# Patient Record
Sex: MALE | Race: WHITE | NOT HISPANIC OR LATINO | Employment: FULL TIME | URBAN - METROPOLITAN AREA
[De-identification: names, ages, dates, MRNs, and addresses within clinical notes are randomized per-mention and may not be internally consistent; named-entity substitution may affect disease eponyms.]

---

## 2021-02-23 ENCOUNTER — LAB (OUTPATIENT)
Dept: LAB | Facility: HOSPITAL | Age: 31
End: 2021-02-23

## 2021-02-23 DIAGNOSIS — R39.89 POSSIBLE URINARY TRACT INFECTION: Primary | ICD-10-CM

## 2021-02-23 LAB
ALBUMIN SERPL-MCNC: 4.5 G/DL (ref 3.5–5.2)
ALBUMIN/GLOB SERPL: 1.8 G/DL
ALP SERPL-CCNC: 60 U/L (ref 39–117)
ALT SERPL W P-5'-P-CCNC: 17 U/L (ref 1–41)
ANION GAP SERPL CALCULATED.3IONS-SCNC: 6.3 MMOL/L (ref 5–15)
AST SERPL-CCNC: 23 U/L (ref 1–40)
BASOPHILS # BLD AUTO: 0.04 10*3/MM3 (ref 0–0.2)
BASOPHILS NFR BLD AUTO: 0.4 % (ref 0–1.5)
BILIRUB SERPL-MCNC: 0.4 MG/DL (ref 0–1.2)
BILIRUB UR QL STRIP: NEGATIVE
BUN SERPL-MCNC: 12 MG/DL (ref 6–20)
BUN/CREAT SERPL: 11.9 (ref 7–25)
CALCIUM SPEC-SCNC: 9.4 MG/DL (ref 8.6–10.5)
CHLORIDE SERPL-SCNC: 105 MMOL/L (ref 98–107)
CLARITY UR: CLEAR
CO2 SERPL-SCNC: 26.7 MMOL/L (ref 22–29)
COLOR UR: YELLOW
CREAT SERPL-MCNC: 1.01 MG/DL (ref 0.76–1.27)
DEPRECATED RDW RBC AUTO: 39.2 FL (ref 37–54)
EOSINOPHIL # BLD AUTO: 0.07 10*3/MM3 (ref 0–0.4)
EOSINOPHIL NFR BLD AUTO: 0.8 % (ref 0.3–6.2)
ERYTHROCYTE [DISTWIDTH] IN BLOOD BY AUTOMATED COUNT: 11.9 % (ref 12.3–15.4)
GFR SERPL CREATININE-BSD FRML MDRD: 87 ML/MIN/1.73
GLOBULIN UR ELPH-MCNC: 2.5 GM/DL
GLUCOSE SERPL-MCNC: 96 MG/DL (ref 65–99)
GLUCOSE UR STRIP-MCNC: NEGATIVE MG/DL
HAV IGM SERPL QL IA: NORMAL
HBV CORE IGM SERPL QL IA: NORMAL
HBV SURFACE AG SERPL QL IA: NORMAL
HCT VFR BLD AUTO: 42.1 % (ref 37.5–51)
HCV AB SER DONR QL: NORMAL
HGB BLD-MCNC: 13.8 G/DL (ref 13–17.7)
HGB UR QL STRIP.AUTO: NEGATIVE
IMM GRANULOCYTES # BLD AUTO: 0.03 10*3/MM3 (ref 0–0.05)
IMM GRANULOCYTES NFR BLD AUTO: 0.3 % (ref 0–0.5)
KETONES UR QL STRIP: NEGATIVE
LEUKOCYTE ESTERASE UR QL STRIP.AUTO: NEGATIVE
LYMPHOCYTES # BLD AUTO: 1.56 10*3/MM3 (ref 0.7–3.1)
LYMPHOCYTES NFR BLD AUTO: 17.5 % (ref 19.6–45.3)
MCH RBC QN AUTO: 29.3 PG (ref 26.6–33)
MCHC RBC AUTO-ENTMCNC: 32.8 G/DL (ref 31.5–35.7)
MCV RBC AUTO: 89.4 FL (ref 79–97)
MONOCYTES # BLD AUTO: 0.51 10*3/MM3 (ref 0.1–0.9)
MONOCYTES NFR BLD AUTO: 5.7 % (ref 5–12)
NEUTROPHILS NFR BLD AUTO: 6.69 10*3/MM3 (ref 1.7–7)
NEUTROPHILS NFR BLD AUTO: 75.3 % (ref 42.7–76)
NITRITE UR QL STRIP: NEGATIVE
NRBC BLD AUTO-RTO: 0 /100 WBC (ref 0–0.2)
PH UR STRIP.AUTO: 5.5 [PH] (ref 5–8)
PLATELET # BLD AUTO: 176 10*3/MM3 (ref 140–450)
PMV BLD AUTO: 10.6 FL (ref 6–12)
POTASSIUM SERPL-SCNC: 4.6 MMOL/L (ref 3.5–5.2)
PROT SERPL-MCNC: 7 G/DL (ref 6–8.5)
PROT UR QL STRIP: NEGATIVE
RBC # BLD AUTO: 4.71 10*6/MM3 (ref 4.14–5.8)
SODIUM SERPL-SCNC: 138 MMOL/L (ref 136–145)
SP GR UR STRIP: 1.02 (ref 1–1.03)
UROBILINOGEN UR QL STRIP: NORMAL
WBC # BLD AUTO: 8.9 10*3/MM3 (ref 3.4–10.8)

## 2021-02-23 PROCEDURE — 87491 CHLMYD TRACH DNA AMP PROBE: CPT | Performed by: FAMILY MEDICINE

## 2021-02-23 PROCEDURE — G0432 EIA HIV-1/HIV-2 SCREEN: HCPCS | Performed by: FAMILY MEDICINE

## 2021-02-23 PROCEDURE — 80053 COMPREHEN METABOLIC PANEL: CPT | Performed by: FAMILY MEDICINE

## 2021-02-23 PROCEDURE — 87529 HSV DNA AMP PROBE: CPT | Performed by: FAMILY MEDICINE

## 2021-02-23 PROCEDURE — 87591 N.GONORRHOEAE DNA AMP PROB: CPT | Performed by: FAMILY MEDICINE

## 2021-02-23 PROCEDURE — 85025 COMPLETE CBC W/AUTO DIFF WBC: CPT | Performed by: FAMILY MEDICINE

## 2021-02-23 PROCEDURE — 80074 ACUTE HEPATITIS PANEL: CPT | Performed by: FAMILY MEDICINE

## 2021-02-23 PROCEDURE — 81003 URINALYSIS AUTO W/O SCOPE: CPT | Performed by: FAMILY MEDICINE

## 2021-02-23 PROCEDURE — 36415 COLL VENOUS BLD VENIPUNCTURE: CPT | Performed by: FAMILY MEDICINE

## 2021-02-23 PROCEDURE — 86592 SYPHILIS TEST NON-TREP QUAL: CPT | Performed by: FAMILY MEDICINE

## 2021-02-24 LAB
HIV1+2 AB SER QL: NORMAL
RPR SER QL: NORMAL

## 2021-02-25 LAB
C TRACH RRNA SPEC QL NAA+PROBE: NEGATIVE
N GONORRHOEA RRNA SPEC QL NAA+PROBE: NEGATIVE

## 2021-02-27 LAB
HSV1 DNA SPEC QL NAA+PROBE: NEGATIVE
HSV2 DNA SPEC QL NAA+PROBE: NEGATIVE

## 2021-09-30 ENCOUNTER — OFFICE VISIT (OUTPATIENT)
Dept: ORTHOPEDIC SURGERY | Facility: CLINIC | Age: 31
End: 2021-09-30

## 2021-09-30 VITALS — WEIGHT: 150 LBS | HEIGHT: 68 IN | BODY MASS INDEX: 22.73 KG/M2

## 2021-09-30 DIAGNOSIS — R10.31 RIGHT GROIN PAIN: Primary | ICD-10-CM

## 2021-09-30 PROCEDURE — LCFCNOCHG: Performed by: ORTHOPAEDIC SURGERY

## 2021-10-01 ENCOUNTER — HOSPITAL ENCOUNTER (OUTPATIENT)
Dept: MRI IMAGING | Facility: HOSPITAL | Age: 31
Discharge: HOME | End: 2021-10-01

## 2021-10-01 DIAGNOSIS — R10.31 RIGHT GROIN PAIN: ICD-10-CM

## 2021-10-01 PROCEDURE — 72195 MRI PELVIS W/O DYE: CPT

## 2021-10-06 ENCOUNTER — DOCUMENTATION (OUTPATIENT)
Dept: ORTHOPEDIC SURGERY | Facility: CLINIC | Age: 31
End: 2021-10-06

## 2021-10-06 RX ORDER — METHYLPREDNISOLONE 4 MG/1
21 TABLET ORAL ONCE
Qty: 21 TABLET | Refills: 0 | Status: SHIPPED | OUTPATIENT
Start: 2021-10-06 | End: 2021-10-06

## 2021-10-18 ENCOUNTER — OFFICE VISIT (OUTPATIENT)
Dept: SPORTS MEDICINE | Facility: CLINIC | Age: 31
End: 2021-10-18

## 2021-10-18 VITALS
WEIGHT: 150 LBS | RESPIRATION RATE: 16 BRPM | HEIGHT: 68 IN | OXYGEN SATURATION: 99 % | TEMPERATURE: 98 F | BODY MASS INDEX: 22.73 KG/M2 | DIASTOLIC BLOOD PRESSURE: 76 MMHG | SYSTOLIC BLOOD PRESSURE: 116 MMHG | HEART RATE: 50 BPM

## 2021-10-18 DIAGNOSIS — S39.81XD SPORTS HERNIA, SUBSEQUENT ENCOUNTER: Primary | ICD-10-CM

## 2021-10-18 PROCEDURE — LCFCNOCHG: Performed by: FAMILY MEDICINE

## 2021-10-21 NOTE — PROGRESS NOTES
Here today for injection as previously planned for the right side sports hernia/athletic pubalgia. We reviewed and discussed his MRI findings again consistent with low-grade reinjury to his previously treated sports hernia. Discussed potential benefit of steroid injection for short-term pain relief, diagnostic benefit, also consider necessity and planning for repeat surgical treatment.    Location of the point of maximal tenderness on the right pubis was identified by palpation and verified with ultrasound. Injection was performed without concurrent ultrasound after localization. Site was marked, prepped with Betadine and alcohol for sterile technique. Injectate mixture 1 mL 1% plain lidocaine, 1 mL of 0.5% bupivacaine, 1 mL Kenalog. This was injected with a 25-gauge needle and tolerated well.    Postprocedure instructions reviewed. Relative rest today, gentle progression with rehab back to sport as tolerated after that.    Diagnoses and all orders for this visit:    Sports hernia, subsequent encounter

## 2021-10-26 ENCOUNTER — TELEPHONE (OUTPATIENT)
Dept: SPORTS MEDICINE | Facility: CLINIC | Age: 31
End: 2021-10-26

## 2021-10-26 NOTE — TELEPHONE ENCOUNTER
Jhonny was reevaluated onsite at the training facility this morning.  He does report reduction in his pain after receiving a steroid injection last week.  He continues to have pain with higher level activity and particularly with changing direction.  Pain is moderately severe, sharp.  He still has tenderness on the right pubic symphysis consistent with his sports hernia.  We discussed ongoing treatment options for this.  We will go ahead and start some daily NSAIDs and continue rehabilitation.  I also think his response at this point indicates that we will need to plan definitive surgical treatment but that can probably wait until the off season.  Goal will be to keep his pain under control and strengthen him enough to continue to play until then.

## 2021-11-11 ENCOUNTER — DOCUMENTATION (OUTPATIENT)
Dept: ORTHOPEDIC SURGERY | Facility: CLINIC | Age: 31
End: 2021-11-11

## 2021-11-11 PROBLEM — S39.81XA SPORTS HERNIA: Status: ACTIVE | Noted: 2021-11-11

## 2021-11-11 NOTE — PROGRESS NOTES
Subjective:     Patient ID: Jhonny Mcfarlane is a 31 y.o. male.    Chief Complaint:  Follow-up right hip and groin pain  History of Present Illness  Jhonny Mcfarlane presents for evaluation of right hip and groin pain, states that he has received improvement from injection to the proximal adductor tendon on his right side.  Still having sharp pain which occasionally gets up to a 7-8 out of 10 localized to the groin region particular when striking a ball for long distance.  Denies associated numbness or tingling, does note some radiation of pain down to the medial proximal thigh.     Social History     Occupational History   • Not on file   Tobacco Use   • Smoking status: Never Smoker   • Smokeless tobacco: Never Used   Vaping Use   • Vaping Use: Never used   Substance and Sexual Activity   • Alcohol use: Yes   • Drug use: Never   • Sexual activity: Defer      No past medical history on file.  No past surgical history on file.    No family history on file.      Review of Systems        Objective:  There were no vitals filed for this visit.  There were no vitals filed for this visit.  There is no height or weight on file to calculate BMI.      Ortho Exam     Right hip-maximal tenderness palpation over proximal adductor with increased pain on maximal passive abduction, 4 out of 5 strength on resisted adduction.  Negative LELA and FADIR exam.  No hip pain on logroll or Stinchfield exam.  Negative straight leg raise right lower extremity.    Imaging:  Review of outside MRI right hip and pelvis indicate strain of the right adductor longus and rectus aponeurosis, on my review there does appear to be regions of partial to high-grade tearing of the origin of both of these extending to the superior portion of the pubis on the right side adjacent to the pubic symphysis where there is no to be moderate hypertrophic changes consistent with osteitis but no active inflammation in this region noted.    Assessment:  Right hip high-grade  sports hernia       Plan:          1. Discussed treatment options at length with patient at today's visit.  Given persistent pain and limitations with functional activities with the right side as well as the fact that he has had prior surgery on that side and has recurrence of his symptoms, we discussed options at length and recommended consideration of surgical treatment for fixation of right sports hernia.  2. We will help coordinate and facilitate procedure to be done pending athletes desired location  3. Follow up: markell Mcfarlane was in agreement with plan and had all questions answered.     Orders:  No orders of the defined types were placed in this encounter.      Medications:  No orders of the defined types were placed in this encounter.      Followup:  No follow-ups on file.    There are no diagnoses linked to this encounter.      Dictated utilizing Dragon dictation

## 2021-11-22 ENCOUNTER — LAB (OUTPATIENT)
Dept: LAB | Facility: HOSPITAL | Age: 31
End: 2021-11-22

## 2021-11-22 ENCOUNTER — OFFICE VISIT (OUTPATIENT)
Dept: SPORTS MEDICINE | Facility: CLINIC | Age: 31
End: 2021-11-22

## 2021-11-22 VITALS
RESPIRATION RATE: 16 BRPM | TEMPERATURE: 98 F | BODY MASS INDEX: 22.73 KG/M2 | HEART RATE: 82 BPM | DIASTOLIC BLOOD PRESSURE: 70 MMHG | WEIGHT: 150 LBS | OXYGEN SATURATION: 99 % | HEIGHT: 68 IN | SYSTOLIC BLOOD PRESSURE: 112 MMHG

## 2021-11-22 DIAGNOSIS — S39.81XD SPORTS HERNIA, SUBSEQUENT ENCOUNTER: ICD-10-CM

## 2021-11-22 DIAGNOSIS — Z02.5 SPORTS PHYSICAL: Primary | ICD-10-CM

## 2021-11-22 DIAGNOSIS — Z01.818 PREOP EXAMINATION: ICD-10-CM

## 2021-11-22 LAB
ALBUMIN SERPL-MCNC: 4.8 G/DL (ref 3.5–5.2)
ALBUMIN/GLOB SERPL: 2.4 G/DL
ALP SERPL-CCNC: 53 U/L (ref 39–117)
ALT SERPL W P-5'-P-CCNC: 23 U/L (ref 1–41)
ANION GAP SERPL CALCULATED.3IONS-SCNC: 7.3 MMOL/L (ref 5–15)
APTT PPP: 25.9 SECONDS (ref 22.7–35.4)
AST SERPL-CCNC: 19 U/L (ref 1–40)
BASOPHILS # BLD AUTO: 0.03 10*3/MM3 (ref 0–0.2)
BASOPHILS NFR BLD AUTO: 0.5 % (ref 0–1.5)
BILIRUB SERPL-MCNC: 0.4 MG/DL (ref 0–1.2)
BUN SERPL-MCNC: 10 MG/DL (ref 6–20)
BUN/CREAT SERPL: 13.9 (ref 7–25)
CALCIUM SPEC-SCNC: 9.2 MG/DL (ref 8.6–10.5)
CHLORIDE SERPL-SCNC: 103 MMOL/L (ref 98–107)
CO2 SERPL-SCNC: 30.7 MMOL/L (ref 22–29)
CREAT SERPL-MCNC: 0.72 MG/DL (ref 0.76–1.27)
DEPRECATED RDW RBC AUTO: 39.2 FL (ref 37–54)
EOSINOPHIL # BLD AUTO: 0.17 10*3/MM3 (ref 0–0.4)
EOSINOPHIL NFR BLD AUTO: 2.8 % (ref 0.3–6.2)
ERYTHROCYTE [DISTWIDTH] IN BLOOD BY AUTOMATED COUNT: 11.9 % (ref 12.3–15.4)
GFR SERPL CREATININE-BSD FRML MDRD: 128 ML/MIN/1.73
GLOBULIN UR ELPH-MCNC: 2 GM/DL
GLUCOSE SERPL-MCNC: 104 MG/DL (ref 65–99)
HCT VFR BLD AUTO: 42.8 % (ref 37.5–51)
HGB BLD-MCNC: 14 G/DL (ref 13–17.7)
IMM GRANULOCYTES # BLD AUTO: 0.01 10*3/MM3 (ref 0–0.05)
IMM GRANULOCYTES NFR BLD AUTO: 0.2 % (ref 0–0.5)
INR PPP: 0.98 (ref 0.9–1.1)
LYMPHOCYTES # BLD AUTO: 1.85 10*3/MM3 (ref 0.7–3.1)
LYMPHOCYTES NFR BLD AUTO: 30.3 % (ref 19.6–45.3)
MCH RBC QN AUTO: 29.5 PG (ref 26.6–33)
MCHC RBC AUTO-ENTMCNC: 32.7 G/DL (ref 31.5–35.7)
MCV RBC AUTO: 90.1 FL (ref 79–97)
MONOCYTES # BLD AUTO: 0.45 10*3/MM3 (ref 0.1–0.9)
MONOCYTES NFR BLD AUTO: 7.4 % (ref 5–12)
NEUTROPHILS NFR BLD AUTO: 3.6 10*3/MM3 (ref 1.7–7)
NEUTROPHILS NFR BLD AUTO: 58.8 % (ref 42.7–76)
NRBC BLD AUTO-RTO: 0 /100 WBC (ref 0–0.2)
PLATELET # BLD AUTO: 186 10*3/MM3 (ref 140–450)
PMV BLD AUTO: 10.9 FL (ref 6–12)
POTASSIUM SERPL-SCNC: 4.5 MMOL/L (ref 3.5–5.2)
PROT SERPL-MCNC: 6.8 G/DL (ref 6–8.5)
PROTHROMBIN TIME: 12.8 SECONDS (ref 11.7–14.2)
RBC # BLD AUTO: 4.75 10*6/MM3 (ref 4.14–5.8)
SODIUM SERPL-SCNC: 141 MMOL/L (ref 136–145)
WBC NRBC COR # BLD: 6.11 10*3/MM3 (ref 3.4–10.8)

## 2021-11-22 PROCEDURE — 85610 PROTHROMBIN TIME: CPT | Performed by: FAMILY MEDICINE

## 2021-11-22 PROCEDURE — LCFCNOCHG: Performed by: FAMILY MEDICINE

## 2021-11-22 PROCEDURE — 85730 THROMBOPLASTIN TIME PARTIAL: CPT | Performed by: FAMILY MEDICINE

## 2021-11-22 PROCEDURE — 80053 COMPREHEN METABOLIC PANEL: CPT | Performed by: FAMILY MEDICINE

## 2021-11-22 PROCEDURE — 85025 COMPLETE CBC W/AUTO DIFF WBC: CPT | Performed by: FAMILY MEDICINE

## 2021-11-22 PROCEDURE — 36415 COLL VENOUS BLD VENIPUNCTURE: CPT | Performed by: FAMILY MEDICINE

## 2021-11-23 NOTE — PROGRESS NOTES
"Jhonny is a 31 y.o. year old male    Cc: Exit physical    History of Present Illness   HPI   Here today for exit physical and in the season.  He is overall doing well with exception of ongoing pain at the right groin at his site of previous sports hernia surgery with exacerbation earlier this year.  He did respond favorably to a steroid injection but notes that he still has a sense of weakness and discomfort at this site.    Review of Systems    /70 (BP Location: Right arm, Patient Position: Sitting, Cuff Size: Adult)   Pulse 82   Temp 98 °F (36.7 °C)   Resp 16   Ht 172.7 cm (68\")   Wt 68 kg (150 lb)   SpO2 99%   BMI 22.81 kg/m²          Physical Exam  Vitals reviewed.   Constitutional:       Appearance: He is well-developed.   HENT:      Head: Normocephalic and atraumatic.   Eyes:      Conjunctiva/sclera: Conjunctivae normal.      Pupils: Pupils are equal, round, and reactive to light.   Cardiovascular:      Comments: No peripheral edema  Pulmonary:      Effort: Pulmonary effort is normal.   Musculoskeletal:      Comments: Mild tenderness palpation at the right pubis into the adductor tendon.  Normal range of motion.  Normal strength with minimal discomfort.   Skin:     General: Skin is warm and dry.   Neurological:      Mental Status: He is alert and oriented to person, place, and time.   Psychiatric:         Behavior: Behavior normal.       EKG Interpretation    Indication: screening    Findings:  Normal Rate  Benign sinus arrhythmia consistent with age and conditioning  Normal QRS  Normal Axis  Normal ST Segment  Normal T Waves    No prior studies were available for comparison.      No current outpatient medications on file.     Diagnoses and all orders for this visit:    Sports physical    Sports hernia, subsequent encounter  -     CBC & Differential  -     Comprehensive Metabolic Panel  -     Protime-INR  -     APTT    Preop examination  -     CBC & Differential  -     Comprehensive Metabolic " Panel  -     Protime-INR  -     APTT       Overall he was able to successfully finish the season despite his reinjury to his groin after having a local steroid injection.  We did discuss at length that his current pain level may simply be secondary to the effects of the injection which would predictably be short-term only and would not have a curative benefit.  We discussed potential of recurrent pain given his previous surgical change and reinjury this year which shows some functional deficiency despite pain reduction.  He is going to continue rehabilitation and we will work to arrange follow-up with a specialist for this condition for definitive surgical treatment.      EMR Dragon/Transcription disclaimer:    Much of this encounter note is an electronic transcription/translation of spoken language to printed text.  The electronic translation of spoken language may permit erroneous, or at times, nonsensical words or phrases to be inadvertently transcribed.  Although I have reviewed the note for such errors some may still exist.

## 2022-03-31 ENCOUNTER — OFFICE VISIT (OUTPATIENT)
Dept: SPORTS MEDICINE | Facility: CLINIC | Age: 32
End: 2022-03-31

## 2022-03-31 VITALS — TEMPERATURE: 96.2 F

## 2022-03-31 DIAGNOSIS — S93.412A SPRAIN OF CALCANEOFIBULAR LIGAMENT OF LEFT ANKLE, INITIAL ENCOUNTER: Primary | ICD-10-CM

## 2022-03-31 PROCEDURE — LCFCNOCHG: Performed by: FAMILY MEDICINE

## 2022-03-31 NOTE — PROGRESS NOTES
Jhonny is a 32 y.o. year old male     Chief Complaint   Patient presents with   • Ankle Pain     Eval for LT ankle        History of Present Illness  HPI   Here to evaluate lateral ankle sprain that occurred 3/23/2022 during a match.  Slowly improving with standard recovery efforts but still having some pain with trying to perform rehab exercises.      Review of Systems    Temp 96.2 °F (35.7 °C) (Temporal)      Physical Exam    Vital signs reviewed.   General: No acute distress.      MSK Exam:  Ortho Exam  Left ankle: Mild soft tissue swelling.  There is tenderness to palpation overlying the ATFL, CFL, and peroneal tendons.  There is no bony tenderness.    Bedside ultrasound examination shows a normal-appearing ATFL.  There is obvious soft tissue swelling.  There is tenosynovitis of the peroneus longus and brevis at the level of the ankle.  The CFL is difficult to visualize, but with dynamic examination is taut.      Diagnoses and all orders for this visit:    Sprain of calcaneofibular ligament of left ankle, initial encounter      Reassuring exam today.  Okay to continue to progress and rehab as tolerated.

## 2022-11-09 ENCOUNTER — DOCUMENTATION (OUTPATIENT)
Dept: SPORTS MEDICINE | Facility: CLINIC | Age: 32
End: 2022-11-09

## 2022-11-09 RX ORDER — METHYLPREDNISOLONE 4 MG/1
TABLET ORAL
Qty: 21 TABLET | Refills: 0 | Status: SHIPPED | OUTPATIENT
Start: 2022-11-09

## 2023-01-23 ENCOUNTER — OFFICE VISIT (OUTPATIENT)
Dept: SPORTS MEDICINE | Facility: CLINIC | Age: 33
End: 2023-01-23

## 2023-01-23 VITALS
BODY MASS INDEX: 23.04 KG/M2 | HEIGHT: 68 IN | SYSTOLIC BLOOD PRESSURE: 122 MMHG | WEIGHT: 152 LBS | HEART RATE: 58 BPM | OXYGEN SATURATION: 99 % | DIASTOLIC BLOOD PRESSURE: 70 MMHG

## 2023-01-23 DIAGNOSIS — Z00.00 ANNUAL PHYSICAL EXAM: Primary | ICD-10-CM

## 2023-01-23 LAB
ALBUMIN SERPL-MCNC: 4.4 G/DL (ref 3.5–5.2)
ALBUMIN/GLOB SERPL: 1.6 G/DL
ALP SERPL-CCNC: 70 U/L (ref 39–117)
ALT SERPL W P-5'-P-CCNC: 16 U/L (ref 1–41)
ANION GAP SERPL CALCULATED.3IONS-SCNC: 8.6 MMOL/L (ref 5–15)
AST SERPL-CCNC: 17 U/L (ref 1–40)
BASOPHILS # BLD AUTO: 0.03 10*3/MM3 (ref 0–0.2)
BASOPHILS NFR BLD AUTO: 0.5 % (ref 0–1.5)
BILIRUB SERPL-MCNC: 0.2 MG/DL (ref 0–1.2)
BUN SERPL-MCNC: 10 MG/DL (ref 6–20)
BUN/CREAT SERPL: 10.8 (ref 7–25)
CALCIUM SPEC-SCNC: 9.2 MG/DL (ref 8.6–10.5)
CHLORIDE SERPL-SCNC: 103 MMOL/L (ref 98–107)
CHOLEST SERPL-MCNC: 169 MG/DL (ref 0–200)
CO2 SERPL-SCNC: 29.4 MMOL/L (ref 22–29)
CREAT SERPL-MCNC: 0.93 MG/DL (ref 0.76–1.27)
DEPRECATED RDW RBC AUTO: 38.2 FL (ref 37–54)
EGFRCR SERPLBLD CKD-EPI 2021: 111.9 ML/MIN/1.73
EOSINOPHIL # BLD AUTO: 0.09 10*3/MM3 (ref 0–0.4)
EOSINOPHIL NFR BLD AUTO: 1.4 % (ref 0.3–6.2)
ERYTHROCYTE [DISTWIDTH] IN BLOOD BY AUTOMATED COUNT: 11.8 % (ref 12.3–15.4)
FERRITIN SERPL-MCNC: 104 NG/ML (ref 30–400)
GLOBULIN UR ELPH-MCNC: 2.8 GM/DL
GLUCOSE SERPL-MCNC: 72 MG/DL (ref 65–99)
HCT VFR BLD AUTO: 43.8 % (ref 37.5–51)
HDLC SERPL QL: 4.45
HDLC SERPL-MCNC: 38 MG/DL (ref 40–60)
HGB BLD-MCNC: 14.8 G/DL (ref 13–17.7)
IMM GRANULOCYTES # BLD AUTO: 0.02 10*3/MM3 (ref 0–0.05)
IMM GRANULOCYTES NFR BLD AUTO: 0.3 % (ref 0–0.5)
IRON 24H UR-MRATE: 97 MCG/DL (ref 59–158)
IRON SATN MFR SERPL: 24 % (ref 20–50)
LDLC SERPL CALC-MCNC: 91 MG/DL (ref 0–100)
LYMPHOCYTES # BLD AUTO: 1.7 10*3/MM3 (ref 0.7–3.1)
LYMPHOCYTES NFR BLD AUTO: 26.3 % (ref 19.6–45.3)
MCH RBC QN AUTO: 30.3 PG (ref 26.6–33)
MCHC RBC AUTO-ENTMCNC: 33.8 G/DL (ref 31.5–35.7)
MCV RBC AUTO: 89.6 FL (ref 79–97)
MONOCYTES # BLD AUTO: 0.36 10*3/MM3 (ref 0.1–0.9)
MONOCYTES NFR BLD AUTO: 5.6 % (ref 5–12)
NEUTROPHILS NFR BLD AUTO: 4.27 10*3/MM3 (ref 1.7–7)
NEUTROPHILS NFR BLD AUTO: 65.9 % (ref 42.7–76)
NRBC BLD AUTO-RTO: 0 /100 WBC (ref 0–0.2)
PLATELET # BLD AUTO: 234 10*3/MM3 (ref 140–450)
PMV BLD AUTO: 11.1 FL (ref 6–12)
POTASSIUM SERPL-SCNC: 4.6 MMOL/L (ref 3.5–5.2)
PROT SERPL-MCNC: 7.2 G/DL (ref 6–8.5)
RBC # BLD AUTO: 4.89 10*6/MM3 (ref 4.14–5.8)
SODIUM SERPL-SCNC: 141 MMOL/L (ref 136–145)
TIBC SERPL-MCNC: 408 MCG/DL (ref 298–536)
TRANSFERRIN SERPL-MCNC: 274 MG/DL (ref 200–360)
TRIGL SERPL-MCNC: 235 MG/DL (ref 0–150)
TSH SERPL DL<=0.05 MIU/L-ACNC: 2.32 UIU/ML (ref 0.27–4.2)
VLDLC SERPL-MCNC: 40 MG/DL (ref 5–40)
WBC NRBC COR # BLD: 6.47 10*3/MM3 (ref 3.4–10.8)

## 2023-01-23 PROCEDURE — 85025 COMPLETE CBC W/AUTO DIFF WBC: CPT | Performed by: FAMILY MEDICINE

## 2023-01-23 PROCEDURE — 82728 ASSAY OF FERRITIN: CPT | Performed by: FAMILY MEDICINE

## 2023-01-23 PROCEDURE — 36415 COLL VENOUS BLD VENIPUNCTURE: CPT | Performed by: FAMILY MEDICINE

## 2023-01-23 PROCEDURE — 80053 COMPREHEN METABOLIC PANEL: CPT | Performed by: FAMILY MEDICINE

## 2023-01-23 PROCEDURE — LCFCNOCHG: Performed by: FAMILY MEDICINE

## 2023-01-23 PROCEDURE — 82306 VITAMIN D 25 HYDROXY: CPT | Performed by: FAMILY MEDICINE

## 2023-01-23 PROCEDURE — 80061 LIPID PANEL: CPT | Performed by: FAMILY MEDICINE

## 2023-01-23 PROCEDURE — 84443 ASSAY THYROID STIM HORMONE: CPT | Performed by: FAMILY MEDICINE

## 2023-01-23 PROCEDURE — 85660 RBC SICKLE CELL TEST: CPT | Performed by: FAMILY MEDICINE

## 2023-01-23 PROCEDURE — 84466 ASSAY OF TRANSFERRIN: CPT | Performed by: FAMILY MEDICINE

## 2023-01-23 PROCEDURE — 83540 ASSAY OF IRON: CPT | Performed by: FAMILY MEDICINE

## 2023-01-23 NOTE — PROGRESS NOTES
"Jhonny is a 33 y.o. year old male presents to [facility]    Chief Complaint   Patient presents with   • Sports Physical       History of Present Illness  Pre-season CPE. No offseason c/o. See scanned media.    I have reviewed the patient's medical, family, and social history in detail and updated the computerized patient record.    /70 (BP Location: Right arm, Patient Position: Sitting, Cuff Size: Adult)   Pulse 58   Ht 172.7 cm (68\")   Wt 68.9 kg (152 lb)   SpO2 99%   BMI 23.11 kg/m²      Physical Exam    Mask worn thru encounter  Vital signs reviewed.   General: No acute distress.  Eyes: conjunctiva clear; pupils equally round and reactive  ENT: external ears atraumatic  CV: no peripheral edema  Resp: normal respiratory effort, no use of accessory muscles  Skin: no rashes or wounds; normal turgor  Psych: mood and affect appropriate; recent and remote memory intact  Neuro: sensation to light touch intact               Diagnoses and all orders for this visit:    Annual physical exam  -     Iron Profile  -     Ferritin  -     CBC & Differential  -     Comprehensive Metabolic Panel  -     Lipid Panel With / Chol / HDL Ratio  -     Vitamin D,25-Hydroxy  -     Sickle Cell Screen  -     Cancel: Thyroid [place] Profile  -     TSH          Follow Up   No follow-ups on file.  Patient was given instructions and counseling regarding his condition or for health maintenance advice. Please see specific information pulled into the AVS if appropriate.     EMR Dragon/Transcription disclaimer:    Much of this encounter note is an electronic transcription/translation of spoken language to printed text.  The electronic translation of spoken language may permit erroneous, or at times, nonsensical words or phrases to be inadvertently transcribed.  Although I have reviewed the note for such errors some may still exist.   "

## 2023-01-24 LAB — 25(OH)D3 SERPL-MCNC: 30.3 NG/ML (ref 30–100)

## 2023-01-25 ENCOUNTER — TELEPHONE (OUTPATIENT)
Dept: SPORTS MEDICINE | Facility: CLINIC | Age: 33
End: 2023-01-25
Payer: COMMERCIAL

## 2023-01-25 LAB — HGB S BLD QL SOLY: NEGATIVE

## 2023-01-25 NOTE — TELEPHONE ENCOUNTER
Reviewed pre-season labs. VitD borderline low, recommend supplement 2000 IU daily.    Office Visit on 01/23/2023   Component Date Value Ref Range Status   • Iron 01/23/2023 97  59 - 158 mcg/dL Final   • Iron Saturation 01/23/2023 24  20 - 50 % Final   • Transferrin 01/23/2023 274  200 - 360 mg/dL Final   • TIBC 01/23/2023 408  298 - 536 mcg/dL Final   • Ferritin 01/23/2023 104.00  30.00 - 400.00 ng/mL Final   • Glucose 01/23/2023 72  65 - 99 mg/dL Final   • BUN 01/23/2023 10  6 - 20 mg/dL Final   • Creatinine 01/23/2023 0.93  0.76 - 1.27 mg/dL Final   • Sodium 01/23/2023 141  136 - 145 mmol/L Final   • Potassium 01/23/2023 4.6  3.5 - 5.2 mmol/L Final   • Chloride 01/23/2023 103  98 - 107 mmol/L Final   • CO2 01/23/2023 29.4 (H)  22.0 - 29.0 mmol/L Final   • Calcium 01/23/2023 9.2  8.6 - 10.5 mg/dL Final   • Total Protein 01/23/2023 7.2  6.0 - 8.5 g/dL Final   • Albumin 01/23/2023 4.4  3.5 - 5.2 g/dL Final   • ALT (SGPT) 01/23/2023 16  1 - 41 U/L Final   • AST (SGOT) 01/23/2023 17  1 - 40 U/L Final   • Alkaline Phosphatase 01/23/2023 70  39 - 117 U/L Final   • Total Bilirubin 01/23/2023 0.2  0.0 - 1.2 mg/dL Final   • Globulin 01/23/2023 2.8  gm/dL Final   • A/G Ratio 01/23/2023 1.6  g/dL Final   • BUN/Creatinine Ratio 01/23/2023 10.8  7.0 - 25.0 Final   • Anion Gap 01/23/2023 8.6  5.0 - 15.0 mmol/L Final   • eGFR 01/23/2023 111.9  >60.0 mL/min/1.73 Final   • Total Cholesterol 01/23/2023 169  0 - 200 mg/dL Final   • Triglycerides 01/23/2023 235 (H)  0 - 150 mg/dL Final   • HDL Cholesterol 01/23/2023 38 (L)  40 - 60 mg/dL Final   • LDL Cholesterol  01/23/2023 91  0 - 100 mg/dL Final   • VLDL Cholesterol 01/23/2023 40  5 - 40 mg/dL Final   • Chol/HDL Ratio 01/23/2023 4.45   Final   • 25 Hydroxy, Vitamin D 01/23/2023 30.3  30.0 - 100.0 ng/ml Final   • Hemoglobin (Hgb) Solubility 01/23/2023 Negative  Negative Final    Since a variety of conditions and other abnormal hemoglobins in  addition to Hemoglobin S may give  false-positive results, positive  Hemoglobin Solubility tests should be confirmed by hemoglobin  fractionation testing.   • TSH 01/23/2023 2.320  0.270 - 4.200 uIU/mL Final   • WBC 01/23/2023 6.47  3.40 - 10.80 10*3/mm3 Final   • RBC 01/23/2023 4.89  4.14 - 5.80 10*6/mm3 Final   • Hemoglobin 01/23/2023 14.8  13.0 - 17.7 g/dL Final   • Hematocrit 01/23/2023 43.8  37.5 - 51.0 % Final   • MCV 01/23/2023 89.6  79.0 - 97.0 fL Final   • MCH 01/23/2023 30.3  26.6 - 33.0 pg Final   • MCHC 01/23/2023 33.8  31.5 - 35.7 g/dL Final   • RDW 01/23/2023 11.8 (L)  12.3 - 15.4 % Final   • RDW-SD 01/23/2023 38.2  37.0 - 54.0 fl Final   • MPV 01/23/2023 11.1  6.0 - 12.0 fL Final   • Platelets 01/23/2023 234  140 - 450 10*3/mm3 Final   • Neutrophil % 01/23/2023 65.9  42.7 - 76.0 % Final   • Lymphocyte % 01/23/2023 26.3  19.6 - 45.3 % Final   • Monocyte % 01/23/2023 5.6  5.0 - 12.0 % Final   • Eosinophil % 01/23/2023 1.4  0.3 - 6.2 % Final   • Basophil % 01/23/2023 0.5  0.0 - 1.5 % Final   • Immature Grans % 01/23/2023 0.3  0.0 - 0.5 % Final   • Neutrophils, Absolute 01/23/2023 4.27  1.70 - 7.00 10*3/mm3 Final   • Lymphocytes, Absolute 01/23/2023 1.70  0.70 - 3.10 10*3/mm3 Final   • Monocytes, Absolute 01/23/2023 0.36  0.10 - 0.90 10*3/mm3 Final   • Eosinophils, Absolute 01/23/2023 0.09  0.00 - 0.40 10*3/mm3 Final   • Basophils, Absolute 01/23/2023 0.03  0.00 - 0.20 10*3/mm3 Final   • Immature Grans, Absolute 01/23/2023 0.02  0.00 - 0.05 10*3/mm3 Final   • nRBC 01/23/2023 0.0  0.0 - 0.2 /100 WBC Final

## 2023-05-31 DIAGNOSIS — M79.672 LEFT FOOT PAIN: Primary | ICD-10-CM
